# Patient Record
Sex: FEMALE | Race: BLACK OR AFRICAN AMERICAN | NOT HISPANIC OR LATINO | Employment: OTHER | URBAN - METROPOLITAN AREA
[De-identification: names, ages, dates, MRNs, and addresses within clinical notes are randomized per-mention and may not be internally consistent; named-entity substitution may affect disease eponyms.]

---

## 2022-01-03 ENCOUNTER — HOSPITAL ENCOUNTER (EMERGENCY)
Facility: HOSPITAL | Age: 82
Discharge: HOME | End: 2022-01-03
Attending: EMERGENCY MEDICINE
Payer: MEDICARE

## 2022-01-03 VITALS
RESPIRATION RATE: 16 BRPM | TEMPERATURE: 98 F | HEART RATE: 91 BPM | OXYGEN SATURATION: 98 % | HEIGHT: 64 IN | BODY MASS INDEX: 18.88 KG/M2 | DIASTOLIC BLOOD PRESSURE: 62 MMHG | SYSTOLIC BLOOD PRESSURE: 111 MMHG

## 2022-01-03 DIAGNOSIS — M54.50 CHRONIC LEFT-SIDED LOW BACK PAIN, UNSPECIFIED WHETHER SCIATICA PRESENT: Primary | ICD-10-CM

## 2022-01-03 DIAGNOSIS — G89.29 CHRONIC LEFT-SIDED LOW BACK PAIN, UNSPECIFIED WHETHER SCIATICA PRESENT: Primary | ICD-10-CM

## 2022-01-03 LAB
ALBUMIN SERPL BCP-MCNC: 3.1 G/DL (ref 3.5–5.2)
ALP SERPL-CCNC: 123 U/L (ref 55–135)
ALT SERPL W/O P-5'-P-CCNC: 58 U/L (ref 10–44)
ANION GAP SERPL CALC-SCNC: 33 MMOL/L (ref 8–16)
AST SERPL-CCNC: 163 U/L (ref 10–40)
BASOPHILS # BLD AUTO: 0.01 K/UL (ref 0–0.2)
BASOPHILS NFR BLD: 0.1 % (ref 0–1.9)
BILIRUB SERPL-MCNC: 0.5 MG/DL (ref 0.1–1)
BUN SERPL-MCNC: 15 MG/DL (ref 8–23)
CALCIUM SERPL-MCNC: 9.4 MG/DL (ref 8.7–10.5)
CHLORIDE SERPL-SCNC: 87 MMOL/L (ref 95–110)
CO2 SERPL-SCNC: 15 MMOL/L (ref 23–29)
CREAT SERPL-MCNC: 1.1 MG/DL (ref 0.5–1.4)
DIFFERENTIAL METHOD: ABNORMAL
EOSINOPHIL # BLD AUTO: 0 K/UL (ref 0–0.5)
EOSINOPHIL NFR BLD: 0 % (ref 0–8)
ERYTHROCYTE [DISTWIDTH] IN BLOOD BY AUTOMATED COUNT: 14.1 % (ref 11.5–14.5)
EST. GFR  (AFRICAN AMERICAN): 55 ML/MIN/1.73 M^2
EST. GFR  (NON AFRICAN AMERICAN): 48 ML/MIN/1.73 M^2
GLUCOSE SERPL-MCNC: 65 MG/DL (ref 70–110)
HCT VFR BLD AUTO: 32.5 % (ref 37–48.5)
HGB BLD-MCNC: 11.1 G/DL (ref 12–16)
IMM GRANULOCYTES # BLD AUTO: 0.06 K/UL (ref 0–0.04)
IMM GRANULOCYTES NFR BLD AUTO: 0.6 % (ref 0–0.5)
LYMPHOCYTES # BLD AUTO: 1.6 K/UL (ref 1–4.8)
LYMPHOCYTES NFR BLD: 16.6 % (ref 18–48)
MCH RBC QN AUTO: 33.5 PG (ref 27–31)
MCHC RBC AUTO-ENTMCNC: 34.2 G/DL (ref 32–36)
MCV RBC AUTO: 98 FL (ref 82–98)
MONOCYTES # BLD AUTO: 0.5 K/UL (ref 0.3–1)
MONOCYTES NFR BLD: 5.1 % (ref 4–15)
NEUTROPHILS # BLD AUTO: 7.6 K/UL (ref 1.8–7.7)
NEUTROPHILS NFR BLD: 77.6 % (ref 38–73)
NRBC BLD-RTO: 1 /100 WBC
PLATELET # BLD AUTO: 145 K/UL (ref 150–450)
PMV BLD AUTO: 10.4 FL (ref 9.2–12.9)
POTASSIUM SERPL-SCNC: 3.7 MMOL/L (ref 3.5–5.1)
PROT SERPL-MCNC: 7.3 G/DL (ref 6–8.4)
RBC # BLD AUTO: 3.31 M/UL (ref 4–5.4)
SODIUM SERPL-SCNC: 135 MMOL/L (ref 136–145)
WBC # BLD AUTO: 9.76 K/UL (ref 3.9–12.7)

## 2022-01-03 PROCEDURE — 85025 COMPLETE CBC W/AUTO DIFF WBC: CPT | Performed by: NURSE PRACTITIONER

## 2022-01-03 PROCEDURE — 80053 COMPREHEN METABOLIC PANEL: CPT | Performed by: NURSE PRACTITIONER

## 2022-01-03 PROCEDURE — 99284 EMERGENCY DEPT VISIT MOD MDM: CPT

## 2022-01-03 NOTE — FIRST PROVIDER EVALUATION
"Medical screening exam completed.  I have conducted a focused provider triage encounter, findings are as follows:    Brief history of present illness:  Pt reports lower back pain and reports back giving out. Reports feels like she has decreased strength     Vitals:    01/03/22 1642   BP: (!) 105/59   BP Location: Right arm   Patient Position: Sitting   Pulse: 88   Resp: 20   Temp: 97.2 °F (36.2 °C)   TempSrc: Oral   SpO2: 100%   Height: 5' 4" (1.626 m)         Preliminary workup initiated; this workup will be continued and followed by the physician or advanced practice provider that is assigned to the patient when roomed.  "

## 2022-01-04 NOTE — ED PROVIDER NOTES
"Encounter Date: 1/3/2022       History     Chief Complaint   Patient presents with    Leg Pain     Jaciel leg pain and back pain d/t fall form tripping over feet while walking     81-year-old female presents with lower extremity weakness left-sided back pain.  Patient has acute on chronic back pain.  She indicates she fell after tripping today denying any head injury no neck injury or any other injury except for some mild lower pain to her left sacral area.  She denies any midline tenderness.  No bowel or bladder incontinence.  She denies any chest or abdominal trauma.        Review of patient's allergies indicates:  No Known Allergies  Past Medical History:   Diagnosis Date    Hypertension      Past Surgical History:   Procedure Laterality Date    APPENDECTOMY      HYSTERECTOMY       No family history on file.  Social History     Tobacco Use    Smoking status: Never Smoker   Substance Use Topics    Alcohol use: No    Drug use: No     Review of Systems   Constitutional: Negative for fever.   HENT: Negative for sore throat.    Respiratory: Negative for shortness of breath.    Cardiovascular: Negative for chest pain.   Gastrointestinal: Negative for nausea.   Genitourinary: Negative for dysuria.   Musculoskeletal: Positive for back pain.   Skin: Negative for rash.   Neurological: Negative for weakness.   Hematological: Does not bruise/bleed easily.       Physical Exam     Initial Vitals [01/03/22 1642]   BP Pulse Resp Temp SpO2   (!) 105/59 88 20 97.2 °F (36.2 °C) 100 %      MAP       --         Vitals:    01/03/22 1642   BP: (!) 105/59   BP Location: Right arm   Patient Position: Sitting   Pulse: 88   Resp: 20   Temp: 97.2 °F (36.2 °C)   TempSrc: Oral   SpO2: 100%   Height: 5' 4" (1.626 m)       Physical Exam    Nursing note and vitals reviewed.  Constitutional: Vital signs are normal. She appears well-developed and well-nourished.   HENT:   Head: Normocephalic and atraumatic.   Nose: Nose normal.   Mouth/Throat: " Uvula is midline.   Neck: Trachea normal. Neck supple.   Normal range of motion.  Cardiovascular: Normal rate, regular rhythm, normal heart sounds, intact distal pulses and normal pulses.   Abdominal: Normal appearance and bowel sounds are normal.   Musculoskeletal:         General: Normal range of motion.      Cervical back: Normal range of motion and neck supple.      Comments: Patient denies any midline tenderness to her thoracic or lower lumbar her lumbar area to deep palpation.  She does have some left sided sacral pain to palpation.     Neurological: She is alert.   Skin: Skin is warm, dry and intact.         ED Course   Procedures  Labs Reviewed   CBC W/ AUTO DIFFERENTIAL - Abnormal; Notable for the following components:       Result Value    RBC 3.31 (*)     Hemoglobin 11.1 (*)     Hematocrit 32.5 (*)     MCH 33.5 (*)     Platelets 145 (*)     Immature Granulocytes 0.6 (*)     Immature Grans (Abs) 0.06 (*)     nRBC 1 (*)     Gran % 77.6 (*)     Lymph % 16.6 (*)     All other components within normal limits   COMPREHENSIVE METABOLIC PANEL - Abnormal; Notable for the following components:    Sodium 135 (*)     Chloride 87 (*)     CO2 15 (*)     Glucose 65 (*)     Albumin 3.1 (*)      (*)     ALT 58 (*)     Anion Gap 33 (*)     eGFR if  55 (*)     eGFR if non  48 (*)     All other components within normal limits          Imaging Results          X-Ray Lumbar Spine Ap And Lateral (Final result)  Result time 01/03/22 17:21:35    Final result by Michele Yañez MD (01/03/22 17:21:35)                 Impression:      As above      Electronically signed by: Otilio Bautista  Date:    01/03/2022  Time:    17:21             Narrative:    EXAMINATION:  XR LUMBAR SPINE AP AND LATERAL    CLINICAL HISTORY:  Back pain    COMPARISON:  None    FINDINGS:  Multiple radiographic views  were obtained.    Decreased bone mineral density.  Multiple mild moderate compression deformities of the  thoracal lumbar spine involving the L3 L1 T12-T11 T10 vertebral body of uncertain chronicity.  Correlate to point tenderness..  Moderate multilevel degenerative disc disease.  Atherosclerotic changes                                 Medications - No data to display  Medical Decision Making:   Initial Assessment:   Patient's labs were evaluated she does have chronic low bicarb.  I look back at some her old x-rays back in 2016 she had some compression fractures in her lumbar area.  Patient is hemodynamically stable I will refer her to Orthopedics I also told her she should probably follow up with her primary care physician next 2-3 days in get into possible rehab strength training so she has better mobility.                      Clinical Impression:   Final diagnoses:  [M54.50, G89.29] Chronic left-sided low back pain, unspecified whether sciatica present (Primary)          ED Disposition Condition    Discharge Stable        ED Prescriptions     None        Follow-up Information     Follow up With Specialties Details Why Contact Info    Dipak Renteria MD Family Medicine   [address] [address]  [phone]      [facility] Orthopedics   [address] [facility] [address]  [phone]    [facility]. Emergency Medicine  If symptoms worsen [address] [facility] [id number] [phone]           Tai De La Torre Jr., MD  01/03/22 3064